# Patient Record
Sex: MALE | Race: WHITE | Employment: FULL TIME | URBAN - METROPOLITAN AREA
[De-identification: names, ages, dates, MRNs, and addresses within clinical notes are randomized per-mention and may not be internally consistent; named-entity substitution may affect disease eponyms.]

---

## 2018-07-26 ENCOUNTER — HOSPITAL ENCOUNTER (EMERGENCY)
Age: 43
Discharge: HOME | End: 2018-07-27
Attending: EMERGENCY MEDICINE
Payer: SELF-PAY

## 2018-07-26 DIAGNOSIS — N17.9 AKI (ACUTE KIDNEY INJURY) (HCC): ICD-10-CM

## 2018-07-26 DIAGNOSIS — T67.5XXA HEAT EXHAUSTION, INITIAL ENCOUNTER: Primary | ICD-10-CM

## 2018-07-26 DIAGNOSIS — R11.2 NAUSEA AND VOMITING, INTRACTABILITY OF VOMITING NOT SPECIFIED, UNSPECIFIED VOMITING TYPE: ICD-10-CM

## 2018-07-26 DIAGNOSIS — E86.0 DEHYDRATION: ICD-10-CM

## 2018-07-26 DIAGNOSIS — D72.829 LEUKOCYTOSIS, UNSPECIFIED TYPE: ICD-10-CM

## 2018-07-26 LAB
ALBUMIN SERPL-MCNC: 5.8 G/DL (ref 3.4–5)
ALBUMIN/GLOB SERPL: 1.2 {RATIO} (ref 0.8–1.7)
ALP SERPL-CCNC: 76 U/L (ref 45–117)
ALT SERPL-CCNC: 142 U/L (ref 16–61)
ANION GAP SERPL CALC-SCNC: 11 MMOL/L (ref 3–18)
AST SERPL-CCNC: 70 U/L (ref 15–37)
BASOPHILS # BLD: 0 K/UL (ref 0–0.1)
BASOPHILS NFR BLD: 0 % (ref 0–3)
BILIRUB SERPL-MCNC: 1.7 MG/DL (ref 0.2–1)
BUN SERPL-MCNC: 23 MG/DL (ref 7–18)
BUN/CREAT SERPL: 7 (ref 12–20)
CALCIUM SERPL-MCNC: 11.8 MG/DL (ref 8.5–10.1)
CHLORIDE SERPL-SCNC: 93 MMOL/L (ref 100–108)
CK MB CFR SERPL CALC: 1.5 % (ref 0–4)
CK MB SERPL-MCNC: 9.7 NG/ML (ref 5–25)
CK SERPL-CCNC: 668 U/L (ref 39–308)
CO2 SERPL-SCNC: 30 MMOL/L (ref 21–32)
CREAT SERPL-MCNC: 3.07 MG/DL (ref 0.6–1.3)
DIFFERENTIAL METHOD BLD: ABNORMAL
EOSINOPHIL # BLD: 0 K/UL (ref 0–0.4)
EOSINOPHIL NFR BLD: 0 % (ref 0–5)
ERYTHROCYTE [DISTWIDTH] IN BLOOD BY AUTOMATED COUNT: 13.2 % (ref 11.6–14.5)
GLOBULIN SER CALC-MCNC: 4.7 G/DL (ref 2–4)
GLUCOSE SERPL-MCNC: 177 MG/DL (ref 74–99)
HCT VFR BLD AUTO: 58.6 % (ref 36–48)
HGB BLD-MCNC: 20.4 G/DL (ref 13–16)
LYMPHOCYTES # BLD: 1.7 K/UL (ref 0.8–3.5)
LYMPHOCYTES NFR BLD: 7 % (ref 20–51)
MAGNESIUM SERPL-MCNC: 2.8 MG/DL (ref 1.6–2.6)
MCH RBC QN AUTO: 29.3 PG (ref 24–34)
MCHC RBC AUTO-ENTMCNC: 34.8 G/DL (ref 31–37)
MCV RBC AUTO: 84.2 FL (ref 74–97)
MONOCYTES # BLD: 2.4 K/UL (ref 0–1)
MONOCYTES NFR BLD: 10 % (ref 2–9)
NEUTS BAND NFR BLD MANUAL: 9 % (ref 0–5)
NEUTS SEG # BLD: 18.1 K/UL (ref 1.8–8)
NEUTS SEG NFR BLD: 74 % (ref 42–75)
PLATELET # BLD AUTO: 306 K/UL (ref 135–420)
PMV BLD AUTO: 11.5 FL (ref 9.2–11.8)
POTASSIUM SERPL-SCNC: 4.5 MMOL/L (ref 3.5–5.5)
PROT SERPL-MCNC: 10.5 G/DL (ref 6.4–8.2)
RBC # BLD AUTO: 6.96 M/UL (ref 4.7–5.5)
RBC MORPH BLD: ABNORMAL
SODIUM SERPL-SCNC: 134 MMOL/L (ref 136–145)
TROPONIN I SERPL-MCNC: <0.02 NG/ML (ref 0–0.06)
WBC # BLD AUTO: 24.4 K/UL (ref 4.6–13.2)

## 2018-07-26 PROCEDURE — 87040 BLOOD CULTURE FOR BACTERIA: CPT | Performed by: PHYSICIAN ASSISTANT

## 2018-07-26 PROCEDURE — 83605 ASSAY OF LACTIC ACID: CPT | Performed by: PHYSICIAN ASSISTANT

## 2018-07-26 PROCEDURE — 96361 HYDRATE IV INFUSION ADD-ON: CPT

## 2018-07-26 PROCEDURE — 74011250636 HC RX REV CODE- 250/636: Performed by: PHYSICIAN ASSISTANT

## 2018-07-26 PROCEDURE — 85025 COMPLETE CBC W/AUTO DIFF WBC: CPT | Performed by: EMERGENCY MEDICINE

## 2018-07-26 PROCEDURE — 93005 ELECTROCARDIOGRAM TRACING: CPT

## 2018-07-26 PROCEDURE — 82550 ASSAY OF CK (CPK): CPT | Performed by: PHYSICIAN ASSISTANT

## 2018-07-26 PROCEDURE — 80053 COMPREHEN METABOLIC PANEL: CPT | Performed by: EMERGENCY MEDICINE

## 2018-07-26 PROCEDURE — 99285 EMERGENCY DEPT VISIT HI MDM: CPT

## 2018-07-26 PROCEDURE — 83735 ASSAY OF MAGNESIUM: CPT | Performed by: PHYSICIAN ASSISTANT

## 2018-07-26 RX ORDER — SODIUM CHLORIDE 0.9 % (FLUSH) 0.9 %
5-10 SYRINGE (ML) INJECTION AS NEEDED
Status: DISCONTINUED | OUTPATIENT
Start: 2018-07-26 | End: 2018-07-27 | Stop reason: HOSPADM

## 2018-07-26 RX ADMIN — SODIUM CHLORIDE 1000 ML: 900 INJECTION, SOLUTION INTRAVENOUS at 23:54

## 2018-07-27 ENCOUNTER — APPOINTMENT (OUTPATIENT)
Dept: CT IMAGING | Age: 43
End: 2018-07-27
Attending: PHYSICIAN ASSISTANT
Payer: SELF-PAY

## 2018-07-27 VITALS
DIASTOLIC BLOOD PRESSURE: 68 MMHG | HEIGHT: 69 IN | TEMPERATURE: 97.2 F | OXYGEN SATURATION: 100 % | HEART RATE: 70 BPM | RESPIRATION RATE: 18 BRPM | BODY MASS INDEX: 28.88 KG/M2 | SYSTOLIC BLOOD PRESSURE: 112 MMHG | WEIGHT: 195 LBS

## 2018-07-27 LAB
AMORPH CRY URNS QL MICRO: ABNORMAL
AMPHET UR QL SCN: NEGATIVE
ANION GAP SERPL CALC-SCNC: 8 MMOL/L (ref 3–18)
APPEARANCE UR: ABNORMAL
BACTERIA URNS QL MICRO: ABNORMAL /HPF
BARBITURATES UR QL SCN: NEGATIVE
BASOPHILS # BLD: 0 K/UL (ref 0–0.1)
BASOPHILS NFR BLD: 0 % (ref 0–2)
BENZODIAZ UR QL: NEGATIVE
BILIRUB UR QL: NEGATIVE
BUN SERPL-MCNC: 20 MG/DL (ref 7–18)
BUN/CREAT SERPL: 10 (ref 12–20)
CALCIUM SERPL-MCNC: 9 MG/DL (ref 8.5–10.1)
CANNABINOIDS UR QL SCN: POSITIVE
CHLORIDE SERPL-SCNC: 102 MMOL/L (ref 100–108)
CO2 SERPL-SCNC: 28 MMOL/L (ref 21–32)
COCAINE UR QL SCN: NEGATIVE
COLOR UR: YELLOW
CREAT SERPL-MCNC: 2.08 MG/DL (ref 0.6–1.3)
DIFFERENTIAL METHOD BLD: ABNORMAL
EOSINOPHIL # BLD: 0 K/UL (ref 0–0.4)
EOSINOPHIL NFR BLD: 0 % (ref 0–5)
EPITH CASTS URNS QL MICRO: ABNORMAL /LPF (ref 0–5)
ERYTHROCYTE [DISTWIDTH] IN BLOOD BY AUTOMATED COUNT: 13.2 % (ref 11.6–14.5)
GLUCOSE SERPL-MCNC: 112 MG/DL (ref 74–99)
GLUCOSE UR STRIP.AUTO-MCNC: NEGATIVE MG/DL
HCT VFR BLD AUTO: 42.2 % (ref 36–48)
HDSCOM,HDSCOM: ABNORMAL
HGB BLD-MCNC: 15.2 G/DL (ref 13–16)
HGB UR QL STRIP: ABNORMAL
HYALINE CASTS URNS QL MICRO: ABNORMAL /LPF (ref 0–2)
KETONES UR QL STRIP.AUTO: ABNORMAL MG/DL
LACTATE SERPL-SCNC: 3.2 MMOL/L (ref 0.4–2)
LACTATE SERPL-SCNC: 3.2 MMOL/L (ref 0.4–2)
LEUKOCYTE ESTERASE UR QL STRIP.AUTO: NEGATIVE
LYMPHOCYTES # BLD: 1.2 K/UL (ref 0.9–3.6)
LYMPHOCYTES NFR BLD: 9 % (ref 21–52)
MCH RBC QN AUTO: 30.5 PG (ref 24–34)
MCHC RBC AUTO-ENTMCNC: 36 G/DL (ref 31–37)
MCV RBC AUTO: 84.7 FL (ref 74–97)
METHADONE UR QL: NEGATIVE
MONOCYTES # BLD: 0.8 K/UL (ref 0.05–1.2)
MONOCYTES NFR BLD: 6 % (ref 3–10)
NEUTS SEG # BLD: 10.9 K/UL (ref 1.8–8)
NEUTS SEG NFR BLD: 85 % (ref 40–73)
NITRITE UR QL STRIP.AUTO: NEGATIVE
OPIATES UR QL: NEGATIVE
PCP UR QL: NEGATIVE
PH UR STRIP: 5 [PH] (ref 5–8)
PLATELET # BLD AUTO: 195 K/UL (ref 135–420)
PMV BLD AUTO: 11 FL (ref 9.2–11.8)
POTASSIUM SERPL-SCNC: 4.5 MMOL/L (ref 3.5–5.5)
PROT UR STRIP-MCNC: 30 MG/DL
RBC # BLD AUTO: 4.98 M/UL (ref 4.7–5.5)
SODIUM SERPL-SCNC: 138 MMOL/L (ref 136–145)
SP GR UR REFRACTOMETRY: 1.02 (ref 1–1.03)
URATE CRY URNS QL MICRO: ABNORMAL
UROBILINOGEN UR QL STRIP.AUTO: 1 EU/DL (ref 0.2–1)
WBC # BLD AUTO: 12.9 K/UL (ref 4.6–13.2)
WBC URNS QL MICRO: ABNORMAL /HPF (ref 0–5)

## 2018-07-27 PROCEDURE — 80048 BASIC METABOLIC PNL TOTAL CA: CPT | Performed by: EMERGENCY MEDICINE

## 2018-07-27 PROCEDURE — 85025 COMPLETE CBC W/AUTO DIFF WBC: CPT | Performed by: EMERGENCY MEDICINE

## 2018-07-27 PROCEDURE — 96374 THER/PROPH/DIAG INJ IV PUSH: CPT

## 2018-07-27 PROCEDURE — 74011636320 HC RX REV CODE- 636/320: Performed by: PHYSICIAN ASSISTANT

## 2018-07-27 PROCEDURE — 74176 CT ABD & PELVIS W/O CONTRAST: CPT

## 2018-07-27 PROCEDURE — 81001 URINALYSIS AUTO W/SCOPE: CPT | Performed by: EMERGENCY MEDICINE

## 2018-07-27 PROCEDURE — 83605 ASSAY OF LACTIC ACID: CPT | Performed by: PHYSICIAN ASSISTANT

## 2018-07-27 PROCEDURE — 96361 HYDRATE IV INFUSION ADD-ON: CPT

## 2018-07-27 PROCEDURE — 74011250636 HC RX REV CODE- 250/636: Performed by: PHYSICIAN ASSISTANT

## 2018-07-27 PROCEDURE — 80307 DRUG TEST PRSMV CHEM ANLYZR: CPT | Performed by: PHYSICIAN ASSISTANT

## 2018-07-27 RX ORDER — ONDANSETRON 2 MG/ML
4 INJECTION INTRAMUSCULAR; INTRAVENOUS
Status: COMPLETED | OUTPATIENT
Start: 2018-07-27 | End: 2018-07-27

## 2018-07-27 RX ADMIN — SODIUM CHLORIDE 1000 ML: 900 INJECTION, SOLUTION INTRAVENOUS at 00:35

## 2018-07-27 RX ADMIN — IOHEXOL: 240 INJECTION, SOLUTION INTRATHECAL; INTRAVASCULAR; INTRAVENOUS; ORAL at 01:47

## 2018-07-27 RX ADMIN — ONDANSETRON 4 MG: 2 INJECTION INTRAMUSCULAR; INTRAVENOUS at 01:47

## 2018-07-27 RX ADMIN — SODIUM CHLORIDE 655 ML: 900 INJECTION, SOLUTION INTRAVENOUS at 01:47

## 2018-07-27 RX ADMIN — SODIUM CHLORIDE 1000 ML: 900 INJECTION, SOLUTION INTRAVENOUS at 00:05

## 2018-07-27 NOTE — DISCHARGE INSTRUCTIONS
Dehydration: Care Instructions  Your Care Instructions  Dehydration happens when your body loses too much fluid. This might happen when you do not drink enough water or you lose large amounts of fluids from your body because of diarrhea, vomiting, or sweating. Severe dehydration can be life-threatening. Water and minerals called electrolytes help put your body fluids back in balance. Learn the early signs of fluid loss, and drink more fluids to prevent dehydration. Follow-up care is a key part of your treatment and safety. Be sure to make and go to all appointments, and call your doctor if you are having problems. It's also a good idea to know your test results and keep a list of the medicines you take. How can you care for yourself at home? · To prevent dehydration, drink plenty of fluids, enough so that your urine is light yellow or clear like water. Choose water and other caffeine-free clear liquids until you feel better. If you have kidney, heart, or liver disease and have to limit fluids, talk with your doctor before you increase the amount of fluids you drink. · If you do not feel like eating or drinking, try taking small sips of water, sports drinks, or other rehydration drinks. · Get plenty of rest.  To prevent dehydration  · Add more fluids to your diet and daily routine, unless your doctor has told you not to. · During hot weather, drink more fluids. Drink even more fluids if you exercise a lot. Stay away from drinks with alcohol or caffeine. · Watch for the symptoms of dehydration. These include:  ¨ A dry, sticky mouth. ¨ Dark yellow urine, and not much of it. ¨ Dry and sunken eyes. ¨ Feeling very tired. · Learn what problems can lead to dehydration. These include:  ¨ Diarrhea, fever, and vomiting. ¨ Any illness with a fever, such as pneumonia or the flu. ¨ Activities that cause heavy sweating, such as endurance races and heavy outdoor work in hot or humid weather.   ¨ Alcohol or drug abuse or withdrawal.  ¨ Certain medicines, such as cold and allergy pills (antihistamines), diet pills (diuretics), and laxatives. ¨ Certain diseases, such as diabetes, cancer, and heart or kidney disease. When should you call for help? Call 911 anytime you think you may need emergency care. For example, call if:    · You passed out (lost consciousness).    Call your doctor now or seek immediate medical care if:    · You are confused and cannot think clearly.     · You are dizzy or lightheaded, or you feel like you may faint.     · You have signs of needing more fluids. You have sunken eyes and a dry mouth, and you pass only a little dark urine.     · You cannot keep fluids down.    Watch closely for changes in your health, and be sure to contact your doctor if:    · You are not making tears.     · Your skin is very dry and sags slowly back into place after you pinch it.     · Your mouth and eyes are very dry. Where can you learn more? Go to http://lizzie-mert.info/. Enter R163 in the search box to learn more about \"Dehydration: Care Instructions. \"  Current as of: November 20, 2017  Content Version: 11.7  © 4076-5986 [employer]. Care instructions adapted under license by [employer] (which disclaims liability or warranty for this information). If you have questions about a medical condition or this instruction, always ask your healthcare professional. [place] [address] any warranty or liability for your use of this information.

## 2018-07-27 NOTE — ED NOTES
Pt placed on bedside CM;  2nd liter of NS up w/o;  SR up x's 1;  Call bell within reach; Pt in NAD; Wife at bedside;   Pt voices no concerns at this time;  wctms

## 2018-07-27 NOTE — ED PROVIDER NOTES
EMERGENCY DEPARTMENT HISTORY AND PHYSICAL EXAM    Date: 7/26/2018  Patient Name: Rosa Merritt    History of Presenting Illness     Chief Complaint   Patient presents with    Abdominal Cramping    Vomiting    Syncope         History Provided By: Patient    Chief Complaint: abd cramping  Duration: 8 Hours  Timing:  Acute and Intermittent  Quality: cramping  Severity: 5 out of 10  Modifying Factors: No modifying factors   Associated Symptoms: back cramping, nausea, vomiting, and syncope    Additional History (Context):   11:24 PM   Rosa Merritt is a 41 y.o. male with no pertinent PMHx presents to the emergency department C/O abd cramping onset 8 hours. Associated sxs include back cramping, nausea, vomiting, and syncope. Pt states that he has been having abd and back cramping intermittently for the past few hours. The patient felt fine yesterday, however he is worried about heat exhaustion as he works out in the sun all day. The patient has not urinated since 11 am today and has not been drinking water. Pt cites another similar episode in the past, was given fluids and the patient felt better. Additionally, the patient had a syncopal episode earlier this evening, arms went numb and felt tingling while standing up and drinking water, EMS was called but the patient declined transport. Ibuprofen was taken for pain but the patient vomited it up. Pt denies hx of rhabdomyolysis, HA, blurred vision, slurred speech, dizziness, SOB, CP, cough, fever, diarrhea, or blood in stool and any other sxs or complaints. PCP: None        Past History     Past Medical History:  History reviewed. No pertinent past medical history. Past Surgical History:  History reviewed. No pertinent surgical history. Family History:  History reviewed. No pertinent family history.     Social History:  Social History   Substance Use Topics    Smoking status: Current Every Day Smoker     Packs/day: 1.50    Smokeless tobacco: Never Used    Alcohol use No       Allergies:  No Known Allergies      Review of Systems   Review of Systems   Constitutional: Negative for fever. Eyes: Negative for visual disturbance. Respiratory: Negative for cough and shortness of breath. Cardiovascular: Negative for chest pain. Gastrointestinal: Positive for abdominal pain, nausea and vomiting. Negative for blood in stool and diarrhea. Musculoskeletal: Positive for back pain. Neurological: Positive for syncope. Negative for speech difficulty and headaches. All other systems reviewed and are negative. Physical Exam     Vitals:    07/27/18 0030 07/27/18 0130 07/27/18 0230 07/27/18 0438   BP: 111/66 106/69 111/67 112/68   Pulse:  75 69 70   Resp:    18   Temp:       SpO2:    100%   Weight:       Height:         Physical Exam   Constitutional: He is oriented to person, place, and time. He appears well-developed and well-nourished. No distress. Lying on stretcher, non toxic, NAD   HENT:   Head: Normocephalic and atraumatic. Right Ear: Tympanic membrane, external ear and ear canal normal. Tympanic membrane is not perforated, not erythematous, not retracted and not bulging. Left Ear: Tympanic membrane, external ear and ear canal normal. Tympanic membrane is not perforated, not erythematous, not retracted and not bulging. Nose: Nose normal. No mucosal edema or rhinorrhea. Right sinus exhibits no maxillary sinus tenderness and no frontal sinus tenderness. Left sinus exhibits no maxillary sinus tenderness and no frontal sinus tenderness. Mouth/Throat: Uvula is midline and oropharynx is clear and moist. Mucous membranes are dry. No uvula swelling. No oropharyngeal exudate, posterior oropharyngeal edema, posterior oropharyngeal erythema or tonsillar abscesses. Eyes: EOM are normal. Pupils are equal, round, and reactive to light. Neck: Normal range of motion. Neck supple.    Cardiovascular: Normal rate, regular rhythm, normal heart sounds and intact distal pulses. No murmur heard. Not tachycardiac at time of exam   Pulmonary/Chest: Effort normal and breath sounds normal. No respiratory distress. He has no wheezes. He has no rales. Abdominal: Soft. Bowel sounds are normal. He exhibits no distension. There is no tenderness. There is no rebound and no guarding. abd non tender, soft, no peritoneal signs    Lymphadenopathy:     He has no cervical adenopathy. Neurological: He is alert and oriented to person, place, and time. Skin: Skin is warm and dry. No rash noted. He is not diaphoretic. No erythema. Psychiatric: He has a normal mood and affect. Judgment normal.   Nursing note and vitals reviewed. Diagnostic Study Results     Labs -     No results found for this or any previous visit (from the past 12 hour(s)). Radiologic Studies -   CT ABD PELV WO CONT   Final Result        CT Results  (Last 48 hours)               07/27/18 0246  CT ABD PELV WO CONT Final result    Impression:  IMPRESSION:       No acute intra-abdominal process. Narrative:  EXAM: CT of the abdomen and pelvis       INDICATION: Pain. COMPARISON: None. TECHNIQUE: Axial CT imaging of the abdomen and pelvis was performed without   intravenous contrast. Multiplanar reformats were generated. Dose reduction   techniques used: automated exposure control, adjustment of the mAs and/or kVp   according to patient size, and iterative reconstruction techniques. _______________       FINDINGS:       LOWER CHEST: Unremarkable. LIVER, BILIARY: Liver is normal. No biliary dilation. Gallbladder is   unremarkable. PANCREAS: Normal.       SPLEEN: Normal.       ADRENALS: Normal.       KIDNEYS: Normal.       LYMPH NODES: No enlarged lymph nodes. GASTROINTESTINAL TRACT: No bowel dilation or wall thickening. PELVIC ORGANS: Unremarkable. VASCULATURE: Unremarkable. BONES: There is mild diffuse lumbar disc degenerative change. OTHER: None. _______________               CXR Results  (Last 48 hours)    None            Medical Decision Making   I am the first provider for this patient. I reviewed the vital signs, available nursing notes, past medical history, past surgical history, family history and social history. Vital Signs-Reviewed the patient's vital signs. Pulse Oximetry Analysis - 98% on RA     Cardiac Monitor:  Rate: 135 bpm  Rhythm: tachycardia    EKG interpretation: (Preliminary)  11:44 PM   77 bpm NSR No ST changes  EKG read by Lukasz Qureshi PA-C at 12:27 AM     Records Reviewed: Nursing Notes and Old Medical Records    Provider Notes (Medical Decision Making):   Dehydration, Urinary obstruction, electrolyte imbalance, PAULA, rhabdo, ACS, sepsis, UTI, SBO, diverticulitis     Procedures:  Procedures    ED Course:   11:24 PM Initial assessment performed. The patients presenting problems have been discussed, and they are in agreement with the care plan formulated and outlined with them. I have encouraged them to ask questions as they arise throughout their visit. 12:24 AM Discussed patient's history, exam, and available diagnostics results with Vicente Soler MD, ED Attending, who agrees with plan. 12:26 AM Discussed patient's history, exam, and available diagnostics results with Abran Fisher MD, internal medicine, in person, who recommends CT with oral contrast. Admission pending r    1:21 AM Discussed patient's history, exam, and available diagnostics results with Abran Fisher MD, internal medicine, who recommends getting the bladder checked if the patient hasn't voided. If he does have urine in his bladder straight cath, if he does not keep giving IV hydration. 1:23 AM  Patient's presentation, labs/imaging and plan of care was reviewed with Vicente Soler MD as part of sign out. Pt was able to give urine sample. UA and UDS pending.  They will review CT results and dispo the patient as part of the plan discussed with the patient. Yosi Vizcarra MD's assistance in completion of this plan is greatly appreciated but it should be noted that I will be the provider of record for this patient. Written by ANNIE Olea, as dictated by Francisco Greco PA-C.     4:13 AM Pt feeling better up and about in his room. Reviewed labs. Pt has had 4L of fluid in ED and creatinine dropped from 3.5 to 2. CT scan shows no diverticulitis white count dropped from 25 to 12. Most likely sxs due to heat exhaustion. Wife is at bedside pt wants to go home. Encouraged pt to continue to drink lots of fluid and recheck creatinine with PCP Monday. Diagnosis and Disposition       Critical Care Time:   4:24 AM  I have spent 45 minutes of critical care time involved in lab review, consultations with specialist, family decision-making, and documentation. During this entire length of time I was immediately available to the patient. Critical Care: The reason for providing this level of medical care for this critically ill patient was due a critical illness that impaired one or more vital organ systems such that there was a high probability of imminent or life threatening deterioration in the patients condition. This care involved high complexity decision making to assess, manipulate, and support vital system functions, to treat this degreee vital organ system failure and to prevent further life threatening deterioration of the patients condition. Discharge Note:  Patients results have been reviewed with them. Patient and/or family have verbally conveyed their understanding and agreement of the patient's signs, symptoms, diagnosis, treatment and prognosis and additionally agree to follow up as recommended or return to the Emergency Room should their condition change prior to their follow-up appointment. Patient verbally agrees with the care-plan and verbally conveys that all of their questions have been answered.    Discharge instructions have also been provided to the patient with some educational information regarding their diagnosis as well a list of reasons why they would want to return to the ER prior to their follow-up appointment should their condition change. CLINICAL IMPRESSION:    1. Heat exhaustion, initial encounter    2. PAULA (acute kidney injury) ([facility] [address]    3. Dehydration    4. Leukocytosis, unspecified type    5. Nausea and vomiting, intractability of vomiting not specified, unspecified vomiting type        Discussion:    PLAN:  1. D/C Home  2. There are no new medications  3. Follow up Information  [address] [address] [place]  Schedule an appointment as soon as possible for a visit in 3 days  For primary care follow up and creatinine recheck  [address] [facility], [address] [facility] [address]   [facility] EMERGENCY DEPT  Go to  As needed, if symptoms worsen  [address] [place] [address]  [phone]       _______________________________    Attestations: This note is prepared by Kaylee Shetty, acting as Scribe for Beny Broderick PA-C. Beny Broderick PA-C :  The scribe's documentation has been prepared under my direction and personally reviewed by me in its entirety. I confirm that the note above accurately reflects all work, treatment, procedures, and medical decision making performed by me. This note is prepared by [facility], acting as Scribe for Yosi Vizcarra MD.    Yosi Vizcarra MD:  The scribe's documentation has been prepared under my direction and personally reviewed by me in its entirety.   I confirm that the note above accurately reflects all work, treatment, procedures, and medical decision making performed by me.    _______________________________

## 2018-07-27 NOTE — ED NOTES
Pt hourly rounding competed. Safety   Pt x resting on stretcher with side rails up and call bell in reach. () in chair    () in parents arms. Toileting   Pt offered ()Bedpan     ()Assistance to Restroom     x()Urinal  Ongoing Updates  Updated on plan of care and status of test results.   Pain Management  Inquired as to comfort and offered comfort measures:    (x) warm blankets   (x) dimmed lights

## 2018-07-27 NOTE — ED NOTES
Pt discharged home stable and ambulatory. Pain level at discharge 0. Pt discharged with mother. Reviewed discharged instructions with pt and wife who verbalized understanding.   Patient armband removed and shredded  Rx x's 0

## 2018-07-27 NOTE — ED NOTES
Repeat lactic, CBC and BMP obtained from existing IV. Pt tolerated well. Pt reports that he cannot drink oral CT contrast r/t abd pain and nausea.  Has drank approx 1/4 of contrast. MD aware and states to scan pt without contrast.

## 2018-07-29 LAB
ATRIAL RATE: 77 BPM
CALCULATED P AXIS, ECG09: 36 DEGREES
CALCULATED R AXIS, ECG10: -5 DEGREES
CALCULATED T AXIS, ECG11: 38 DEGREES
DIAGNOSIS, 93000: NORMAL
P-R INTERVAL, ECG05: 160 MS
Q-T INTERVAL, ECG07: 370 MS
QRS DURATION, ECG06: 88 MS
QTC CALCULATION (BEZET), ECG08: 418 MS
VENTRICULAR RATE, ECG03: 77 BPM

## 2018-08-01 LAB
BACTERIA SPEC CULT: NORMAL
SERVICE CMNT-IMP: NORMAL

## 2019-05-06 ENCOUNTER — HOSPITAL ENCOUNTER (EMERGENCY)
Age: 44
Discharge: HOME | End: 2019-05-06
Attending: EMERGENCY MEDICINE
Payer: SELF-PAY

## 2019-05-06 ENCOUNTER — APPOINTMENT (OUTPATIENT)
Dept: GENERAL RADIOLOGY | Age: 44
End: 2019-05-06
Attending: PHYSICIAN ASSISTANT
Payer: SELF-PAY

## 2019-05-06 VITALS
TEMPERATURE: 98 F | RESPIRATION RATE: 16 BRPM | DIASTOLIC BLOOD PRESSURE: 77 MMHG | WEIGHT: 190 LBS | HEART RATE: 67 BPM | HEIGHT: 69 IN | BODY MASS INDEX: 28.14 KG/M2 | OXYGEN SATURATION: 100 % | SYSTOLIC BLOOD PRESSURE: 132 MMHG

## 2019-05-06 DIAGNOSIS — S39.012A LOW BACK STRAIN, INITIAL ENCOUNTER: Primary | ICD-10-CM
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
## 2019-05-06 PROCEDURE — 99282 EMERGENCY DEPT VISIT SF MDM: CPT

## 2019-05-06 PROCEDURE — 72110 X-RAY EXAM L-2 SPINE 4/>VWS: CPT

## 2019-05-06 RX ORDER — CHLORZOXAZONE 500 MG/1
500 TABLET ORAL
Qty: 15 TAB | Refills: 0 | Status: SHIPPED | OUTPATIENT
Start: 2019-05-06

## 2019-05-06 RX ORDER — METHYLPREDNISOLONE 4 MG/1
TABLET ORAL
Qty: 1 DOSE PACK | Refills: 0 | Status: SHIPPED | OUTPATIENT
Start: 2019-05-06

## 2019-05-06 NOTE — LETTER
[facility] [place] 
THE [facility] EMERGENCY DEPT 
[address] [address] 
[phone] Work/School Note Date: 5/6/2019 To Whom It May concern: 
 
Joseph Aguayo was seen and treated today in the emergency room by the following provider(s): 
Attending Provider: Mamie Handy MD 
Physician Assistant: Maria Del Rosario Medina PA-C. Joseph Aguayo may return to work on 05/08/2019. Sincerely, Cristobal Garcia PA-C
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
## 2022-03-08 NOTE — ED TRIAGE NOTES
[place] from [facility] called regarding potassium 20MEQ - patient already taking twice per day, is this an additional dosage? Please advise.   Triage: pt with muscle cramping and abdominal cramping. Vomiting x 4 hours. Syncope for several seconds this evening. EMS came to house, pt declined transport. 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 Last urination was at 11am today.